# Patient Record
Sex: FEMALE | Race: WHITE | NOT HISPANIC OR LATINO | Employment: OTHER | URBAN - NONMETROPOLITAN AREA
[De-identification: names, ages, dates, MRNs, and addresses within clinical notes are randomized per-mention and may not be internally consistent; named-entity substitution may affect disease eponyms.]

---

## 2017-02-15 RX ORDER — LEVOTHYROXINE SODIUM 0.1 MG/1
TABLET ORAL
Qty: 30 TAB | Refills: 10 | Status: SHIPPED | OUTPATIENT
Start: 2017-02-15 | End: 2017-08-24

## 2017-02-21 ENCOUNTER — OFFICE VISIT (OUTPATIENT)
Dept: MEDICAL GROUP | Facility: PHYSICIAN GROUP | Age: 82
End: 2017-02-21
Payer: MEDICARE

## 2017-02-21 VITALS
DIASTOLIC BLOOD PRESSURE: 84 MMHG | HEIGHT: 58 IN | OXYGEN SATURATION: 97 % | RESPIRATION RATE: 18 BRPM | HEART RATE: 78 BPM | TEMPERATURE: 97.3 F | SYSTOLIC BLOOD PRESSURE: 122 MMHG | BODY MASS INDEX: 29.6 KG/M2 | WEIGHT: 141 LBS

## 2017-02-21 DIAGNOSIS — E11.69 DIABETES MELLITUS TYPE 2 IN OBESE (HCC): ICD-10-CM

## 2017-02-21 DIAGNOSIS — L85.9 HYPERKERATOSIS: ICD-10-CM

## 2017-02-21 DIAGNOSIS — E66.9 OBESITY (BMI 30-39.9): ICD-10-CM

## 2017-02-21 DIAGNOSIS — E55.9 VITAMIN D DEFICIENCY: ICD-10-CM

## 2017-02-21 DIAGNOSIS — E66.9 DIABETES MELLITUS TYPE 2 IN OBESE (HCC): ICD-10-CM

## 2017-02-21 DIAGNOSIS — E03.4 HYPOTHYROIDISM DUE TO ACQUIRED ATROPHY OF THYROID: ICD-10-CM

## 2017-02-21 PROCEDURE — G8432 DEP SCR NOT DOC, RNG: HCPCS | Performed by: INTERNAL MEDICINE

## 2017-02-21 PROCEDURE — 1036F TOBACCO NON-USER: CPT | Performed by: INTERNAL MEDICINE

## 2017-02-21 PROCEDURE — G8484 FLU IMMUNIZE NO ADMIN: HCPCS | Performed by: INTERNAL MEDICINE

## 2017-02-21 PROCEDURE — 4040F PNEUMOC VAC/ADMIN/RCVD: CPT | Mod: 8P | Performed by: INTERNAL MEDICINE

## 2017-02-21 PROCEDURE — 1101F PT FALLS ASSESS-DOCD LE1/YR: CPT | Performed by: INTERNAL MEDICINE

## 2017-02-21 PROCEDURE — 99214 OFFICE O/P EST MOD 30 MIN: CPT | Performed by: INTERNAL MEDICINE

## 2017-02-21 PROCEDURE — G8420 CALC BMI NORM PARAMETERS: HCPCS | Performed by: INTERNAL MEDICINE

## 2017-02-21 RX ORDER — LEVOTHYROXINE SODIUM 112 UG/1
112 TABLET ORAL
Qty: 30 TAB | Refills: 6 | Status: SHIPPED | OUTPATIENT
Start: 2017-02-21 | End: 2017-09-26 | Stop reason: SDUPTHER

## 2017-02-21 NOTE — PATIENT INSTRUCTIONS
Vit D increase to 5,000 IU    Increase the synthroid to 112 mcg a day    Labs and follow up in 3 mos    Dermatology

## 2017-02-21 NOTE — MR AVS SNAPSHOT
"        Deepali Steiner   2017 9:40 AM   Office Visit   MRN: [id number]    Department:  [facility]   Dept Phone:  [phone]    Description:  Female : 1935   Provider:  Dee DEWITT M.D.           Reason for Visit     Labs Only Completed 16      Allergies as of 2017     Allergen Noted Reactions    Pcn [Penicillins] 2014   Swelling    Sulfa Drugs 2014   Hives    Pain Relief 2014       Pt states can not take any pain killers      You were diagnosed with     Hypothyroidism due to acquired atrophy of thyroid   [9667733]       Vitamin D deficiency   [3247763]       Obesity (BMI 30-39.9)   [053704]       Diabetes mellitus type 2 in obese (CMS-HCC)   [346939]       Hyperkeratosis   [378193]         Vital Signs     Blood Pressure Pulse Temperature Respirations Height Weight    122/84 mmHg 78 36.3 °C (97.3 °F) 18 1.473 m (4' 9.99\") 63.957 kg (141 lb)    Body Mass Index Oxygen Saturation Smoking Status             29.48 kg/m2 97% Former Smoker         Basic Information     Date Of Birth Sex Race Ethnicity Preferred Language    1935 Female White Non- English      Problem List              ICD-10-CM Priority Class Noted - Resolved    Diabetes mellitus type 2 in obese (CMS-[facility]) E11.9, E66.9 High  2014 - Present    Osteoarthritis M19.90   2014 - Present    Edema R60.9   2014 - Present    Sciatica M54.30   2014 - Present    Lower urinary tract infectious disease N39.0   2014 - Present    Neuropathy (CMS-HCC) G62.9   2014 - Present    Hypothyroid E03.9   2014 - Present    Depression F32.9   2014 - Present    Cellulitis L03.90   12/3/2014 - Present    Excessive sleepiness G47.10   2014 - Present    Diarrhea R19.7   2015 - Present    Obesity (BMI 30-39.9) E66.9   2015 - Present    Dental abscess K04.7   2015 - Present    Vision loss H54.7   2016 - Present    Allergic rhinitis due to pollen " J30.1   2/16/2016 - Present    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   11/16/2016 - Present    Vitamin D deficiency E55.9   2/21/2017 - Present      Health Maintenance        Date Due Completion Dates    DIABETES MONOFILAMENT / LE EXAM 1935 ---    RETINAL SCREENING 6/11/1953 ---    IMM DTaP/Tdap/Td Vaccine (1 - Tdap) 6/11/1954 ---    PAP SMEAR 6/11/1956 ---    IMM ZOSTER VACCINE 6/11/1995 ---    BONE DENSITY 6/11/2000 ---    IMM PNEUMOCOCCAL 65+ (ADULT) LOW/MEDIUM RISK SERIES (1 of 2 - PCV13) 6/11/2000 ---    MAMMOGRAM 7/27/2010 7/27/2009, 7/27/2009, 11/29/2006, 11/21/2005, 11/16/2004    COLONOSCOPY 11/16/2011 11/16/2001 (Done)    Override on 11/16/2001: Done    URINE ACR / MICROALBUMIN 11/19/2015 11/19/2014    IMM INFLUENZA (1) 9/1/2016 ---    A1C SCREENING 5/17/2017 11/17/2016, 11/19/2014    FASTING LIPID PROFILE 11/17/2017 11/17/2016, 11/19/2014    SERUM CREATININE 11/17/2017 11/17/2016, 1/4/2016, 9/10/2015, 5/28/2015, 11/19/2014            Current Immunizations     No immunizations on file.      Below and/or attached are the medications your provider expects you to take. Review all of your home medications and newly ordered medications with your provider and/or pharmacist. Follow medication instructions as directed by your provider and/or pharmacist. Please keep your medication list with you and share with your provider. Update the information when medications are discontinued, doses are changed, or new medications (including over-the-counter products) are added; and carry medication information at all times in the event of emergency situations     Allergies:  PCN - Swelling     SULFA DRUGS - Hives     PAIN RELIEF - (reactions not documented)               Medications  Valid as of: February 21, 2017 - 10:32 AM    Generic Name Brand Name Tablet Size Instructions for use    Aspirin (Chew Tab) ASA 81 MG Take 81 mg by mouth every day.        Cholecalciferol (Cap) VITAMIN D3 5000 UNIT Take 1 Cap by mouth every  day.        Cranberry (Tab) Cranberry 125 MG Take  by mouth.        Fexofenadine HCl   Take  by mouth.        Gabapentin (Cap) NEURONTIN 300 MG Take 300 mg by mouth 3 times a day.        Glucosamine Sulfate (Cap) glucosamine Sulfate 500 MG Take 500 mg by mouth 2 Times a Day.        Ibuprofen (Cap) Ibuprofen 200 MG Take  by mouth.        Levothyroxine Sodium (Tab) SYNTHROID 100 MCG TAKE ONE TABLET BY MOUTH ONE TIME DAILY        Levothyroxine Sodium (Tab) SYNTHROID 112 MCG Take 1 Tab by mouth Every morning on an empty stomach.        Multiple Vitamins-Minerals (Tab) OCUVITE  Take 1 Tab by mouth every day.        .                 Medicines prescribed today were sent to:     [facility] [place], [place] - [address] [facility] [place] [address]    Phone: [phone] Fax: [phone]    Open 24 Hours?: No      Medication refill instructions:       If your prescription bottle indicates you have medication refills left, it is not necessary to call your provider’s office. Please contact your pharmacy and they will refill your medication.    If your prescription bottle indicates you do not have any refills left, you may request refills at any time through one of the following ways: The online [employer] system (except Urgent Care), by calling your provider’s office, or by asking your pharmacy to contact your provider’s office with a refill request. Medication refills are processed only during regular business hours and may not be available until the next business day. Your provider may request additional information or to have a follow-up visit with you prior to refilling your medication.   *Please Note: Medication refills are assigned a new Rx number when refilled electronically. Your pharmacy may indicate that no refills were authorized even though a new prescription for the same medication is available at the pharmacy. Please request the medicine by name with the pharmacy before contacting your  provider for a refill.        Your To Do List     Future Labs/Procedures Complete By Expires    BASIC METABOLIC PANEL  As directed 2/21/2018    TSH  As directed 2/21/2018      Referral     A referral request has been sent to our patient care coordination department. Please allow 3-5 business days for us to process this request and contact you either by phone or mail. If you do not hear from us by the 5th business day, please call us at [phone].        Instructions    Vit D increase to 5,000 IU    Increase the synthroid to 112 mcg a day    Labs and follow up in 3 mos    Dermatology           [employer] Access Code: [email or web address]  Expires: 3/23/2017 10:32 AM    [employer]  A secure, online tool to manage your health information     [employer]’s ONE [employer]® is a secure, online tool that connects you to your personalized health information from the privacy of your home -- day or night - making it very easy for you to manage your healthcare. Once the activation process is completed, you can even access your medical information using the ONE RECOVERY gal, which is available for free in the [employer] Gal store or Google Play store.     ONE [employer]OV[employer] provides the following levels of access (as shown below):   My Chart Features   [facility] Doctor [facility]  Urgent  Care [facility]  Primary Care  Doctor   Email your healthcare team securely and privately 24/7 X X X    Manage appointments: schedule your next appointment; view details of past/upcoming appointments X      Request prescription refills. X      View recent personal medical records, including lab and immunizations X X X X   View health record, including health history, allergies, medications X X X X   Read reports about your outpatient visits, procedures, consult and ER notes X X X X   See your discharge summary, which is a recap of your hospital and/or ER visit that includes your diagnosis, lab results, and care plan. X X       How to register for  MyChart:  1. Go to  [email or web address].  2. Click on the Sign Up Now box, which takes you to the New Member Sign Up page. You will need to provide the following information:  a. Enter your [employer] Access Code exactly as it appears at the top of this page. (You will not need to use this code after you’ve completed the sign-up process. If you do not sign up before the expiration date, you must request a new code.)   b. Enter your date of birth.   c. Enter your home email address.   d. Click Submit, and follow the next screen’s instructions.  3. Create a [employer] ID. This will be your [employer] login ID and cannot be changed, so think of one that is secure and easy to remember.  4. Create a [employer] password. You can change your password at any time.  5. Enter your Password Reset Question and Answer. This can be used at a later time if you forget your password.   6. Enter your e-mail address. This allows you to receive e-mail notifications when new information is available in [employer].  7. Click Sign Up. You can now view your health information.    For assistance activating your [employer] account, call [phone]

## 2017-02-21 NOTE — ASSESSMENT & PLAN NOTE
Patient has some skin lesions she would like checked, the area at the left forehead seems to be growing to her.  She reports history of skin cancers requiring surgery however, does not know the type of skin cancer.  She would like to see dermatology for completeness.

## 2017-02-21 NOTE — PROGRESS NOTES
Chief Complaint   Patient presents with   • Labs Only     Completed 11/17/16       HISTORY OF PRESENT ILLNESS: Patient is a 81 y.o. female established patient who presents today to discuss the medical issues below.    Hypothyroid  Continues on the 100 mcg daily, had labs. Weight is remaining stable at the 141 range.  Denies constipation diarrhea. She does have ongoing fatigue and naps daily. No night sweats    Vitamin D deficiency  Patient states she is taking 2,000 IU and had labs.      Obesity (BMI 30-39.9)  Weight is remaining stable.      Diabetes mellitus type 2 in obese  Not following at home, no polydipsia polyuria.  On no meds since the weight loss.  She is trying to follow diet.      Hyperkeratosis  Patient has some skin lesions she would like checked, the area at the left forehead seems to be growing to her.  She reports history of skin cancers requiring surgery however, does not know the type of skin cancer.  She would like to see dermatology for completeness.       Patient Active Problem List    Diagnosis Date Noted   • Diabetes mellitus type 2 in obese (CMS-HCC) 11/18/2014     Priority: High   • Vitamin D deficiency 02/21/2017   • Hyperkeratosis 02/21/2017   • Meniere's disease of right ear 11/16/2016   • Vision loss 02/16/2016   • Allergic rhinitis due to pollen 02/16/2016   • Dental abscess 07/16/2015   • Obesity (BMI 30-39.9) 06/04/2015   • Diarrhea 04/16/2015   • Excessive sleepiness 12/19/2014   • Cellulitis 12/03/2014   • Osteoarthritis 11/18/2014   • Edema 11/18/2014   • Sciatica 11/18/2014   • Lower urinary tract infectious disease 11/18/2014   • Neuropathy (CMS-HCC) 11/18/2014   • Hypothyroid 11/18/2014   • Depression 11/18/2014       Allergies:Pcn; Sulfa drugs; and Pain relief    Current Outpatient Prescriptions   Medication Sig Dispense Refill   • cholecalciferol (VITAMIN D3) 5000 UNIT Cap Take 1 Cap by mouth every day. 90 Cap 3   • levothyroxine (SYNTHROID) 112 MCG Tab Take 1 Tab by mouth  Every morning on an empty stomach. 30 Tab 6   • levothyroxine (SYNTHROID) 100 MCG Tab TAKE ONE TABLET BY MOUTH ONE TIME DAILY 30 Tab 10   • gabapentin (NEURONTIN) 300 MG Cap Take 300 mg by mouth 3 times a day.     • Multiple Vitamins-Minerals (OCUVITE) Tab Take 1 Tab by mouth every day.     • Fexofenadine HCl (ALLEGRA ALLERGY PO) Take  by mouth.     • Ibuprofen (ADVIL) 200 MG CAPS Take  by mouth.     • aspirin (BABY ASPIRIN) 81 MG CHEW chewable tablet Take 81 mg by mouth every day.     • glucosamine Sulfate 500 MG Cap Take 500 mg by mouth 2 Times a Day.     • Cranberry 125 MG TABS Take  by mouth.       No current facility-administered medications for this visit.         Past Medical History   Diagnosis Date   • Diabetes mellitus (CMS-HCC)    • Diabetes (CMS-HCC) 2014   • Osteoarthritis 2014     recommended hips and knee replacemtns, Dr Sarah Colunga   • Edema 2014   • Sciatica 2014   • UTI (lower urinary tract infection) 2014   • Neuropathy (CMS-HCC) 2014     Dr Williamson idiopathic neuropathy, 2003   • Hypothyroid 2014   • Depression 2014   • Idiopathic neuropathy 2014     Patient adamant this started prior to the dx of DM/    • Vertigo 2014   • Excessive sleepiness 2014   • Diarrhea 2015   • Dental abscess 2015   • Vision loss 2016   • Allergic rhinitis due to pollen 2016   • Vitamin D deficiency 2017   • Hyperkeratosis 2017       Social History   Substance Use Topics   • Smoking status: Former Smoker     Types: Cigarettes     Quit date: 2016   • Smokeless tobacco: Never Used   • Alcohol Use: 0.0 oz/week     0 Standard drinks or equivalent per week      Comment: occasional        Family Status   Relation Status Death Age   • Son  56     MI   • Son  36        • Mother  93     colon rupture   • Father  72     thrombosis   History reviewed. No pertinent family  "history.    ROS:    Respiratory: Negative for cough, sputum production, shortness of breath or wheezing.    Cardiovascular: Negative for chest pain, palpitations, orthopnea, dyspnea with exertion or edema.   Gastrointestinal: Negative for GI upset, nausea, vomiting, abdominal pain, constipation or diarrhea.   Genitourinary: Negative for dysuria, urgency, hesitancy or frequency.       Exam:    Blood pressure 122/84, pulse 78, temperature 36.3 °C (97.3 °F), resp. rate 18, height 1.473 m (4' 9.99\"), weight 63.957 kg (141 lb), SpO2 97 %.  General:  Well nourished, well developed female in NAD.  Skin: Diffuse hyperkeratosis one at the left forehead that concerns her consistent with verrucous keratosis no pigmentation no ulceration.  Pulmonary: Clear to ausculation and percussion.  Normal effort. No rales, rhonchi, or wheezing.  Cardiovascular: Regular rate and rhythm without murmur.   Abdomen: Normal bowel sounds soft and nontender no palpable liver spleen bladder mass.  Extremities: No LE edema noted.  Neuro: Grossly nonfocal.  Psych: Alert and oriented to person, place, and time. Appropriate mood and conversation.    LABS: Results reviewed and discussed with the patient, questions answered.      This dictation was created using voice recognition software. I have made reasonable attempts to correct errors, however, errors of grammar and content may exist.          Assessment/Plan:    1. Hypothyroidism due to acquired atrophy of thyroid  TSH remains elevated, discussed with the patient increased Synthroid to 112 µg follow-up labs monitor clinically  - TSH; Future  - levothyroxine (SYNTHROID) 112 MCG Tab; Take 1 Tab by mouth Every morning on an empty stomach.  Dispense: 30 Tab; Refill: 6    2. Vitamin D deficiency  Vitamin D level subtherapeutic increase supplementation follow-up labs  - cholecalciferol (VITAMIN D3) 5000 UNIT Cap; Take 1 Cap by mouth every day.  Dispense: 90 Cap; Refill: 3    3. Obesity (BMI " 30-39.9)  Remaining stable with diet    4. Diabetes mellitus type 2 in obese (CMS-HCC)  A1c is adequately controlled on no medications. Patient is excited to not be taking medications. Reviewed diet exercise weight loss  - BASIC METABOLIC PANEL; Future    5. Hyperkeratosis  All appear benign patient requesting referral to dermatology for evaluation and/or therapy.  - REFERRAL TO DERMATOLOGY

## 2017-02-21 NOTE — ASSESSMENT & PLAN NOTE
Not following at home, no polydipsia polyuria.  On no meds since the weight loss.  She is trying to follow diet.

## 2017-02-28 ENCOUNTER — OFFICE VISIT (OUTPATIENT)
Dept: URGENT CARE | Facility: PHYSICIAN GROUP | Age: 82
End: 2017-02-28
Payer: MEDICARE

## 2017-02-28 ENCOUNTER — HOSPITAL ENCOUNTER (OUTPATIENT)
Facility: MEDICAL CENTER | Age: 82
End: 2017-02-28
Attending: PHYSICIAN ASSISTANT
Payer: MEDICARE

## 2017-02-28 VITALS
BODY MASS INDEX: 30.23 KG/M2 | OXYGEN SATURATION: 99 % | HEART RATE: 59 BPM | SYSTOLIC BLOOD PRESSURE: 124 MMHG | DIASTOLIC BLOOD PRESSURE: 80 MMHG | WEIGHT: 144 LBS | RESPIRATION RATE: 16 BRPM | HEIGHT: 58 IN | TEMPERATURE: 98.6 F

## 2017-02-28 DIAGNOSIS — R30.0 DYSURIA: ICD-10-CM

## 2017-02-28 DIAGNOSIS — N30.00 ACUTE CYSTITIS WITHOUT HEMATURIA: ICD-10-CM

## 2017-02-28 LAB
APPEARANCE UR: NORMAL
BILIRUB UR STRIP-MCNC: NORMAL MG/DL
COLOR UR AUTO: YELLOW
GLUCOSE UR STRIP.AUTO-MCNC: NORMAL MG/DL
KETONES UR STRIP.AUTO-MCNC: NORMAL MG/DL
LEUKOCYTE ESTERASE UR QL STRIP.AUTO: NORMAL
NITRITE UR QL STRIP.AUTO: NORMAL
PH UR STRIP.AUTO: 6 [PH] (ref 5–8)
PROT UR QL STRIP: NORMAL MG/DL
RBC UR QL AUTO: NORMAL
SP GR UR STRIP.AUTO: 1.02
UROBILINOGEN UR STRIP-MCNC: NORMAL MG/DL

## 2017-02-28 PROCEDURE — 4040F PNEUMOC VAC/ADMIN/RCVD: CPT | Mod: 8P | Performed by: PHYSICIAN ASSISTANT

## 2017-02-28 PROCEDURE — G8484 FLU IMMUNIZE NO ADMIN: HCPCS | Performed by: PHYSICIAN ASSISTANT

## 2017-02-28 PROCEDURE — G8419 CALC BMI OUT NRM PARAM NOF/U: HCPCS | Performed by: PHYSICIAN ASSISTANT

## 2017-02-28 PROCEDURE — 1101F PT FALLS ASSESS-DOCD LE1/YR: CPT | Performed by: PHYSICIAN ASSISTANT

## 2017-02-28 PROCEDURE — 99214 OFFICE O/P EST MOD 30 MIN: CPT | Performed by: PHYSICIAN ASSISTANT

## 2017-02-28 PROCEDURE — 81002 URINALYSIS NONAUTO W/O SCOPE: CPT | Performed by: PHYSICIAN ASSISTANT

## 2017-02-28 PROCEDURE — 87086 URINE CULTURE/COLONY COUNT: CPT

## 2017-02-28 PROCEDURE — 87186 SC STD MICRODIL/AGAR DIL: CPT

## 2017-02-28 PROCEDURE — G8432 DEP SCR NOT DOC, RNG: HCPCS | Performed by: PHYSICIAN ASSISTANT

## 2017-02-28 PROCEDURE — 87077 CULTURE AEROBIC IDENTIFY: CPT

## 2017-02-28 PROCEDURE — 1036F TOBACCO NON-USER: CPT | Performed by: PHYSICIAN ASSISTANT

## 2017-02-28 RX ORDER — NITROFURANTOIN 25; 75 MG/1; MG/1
100 CAPSULE ORAL 2 TIMES DAILY
Qty: 10 CAP | Refills: 0 | Status: SHIPPED | OUTPATIENT
Start: 2017-02-28 | End: 2017-03-05

## 2017-02-28 ASSESSMENT — ENCOUNTER SYMPTOMS
NAUSEA: 0
FLANK PAIN: 0
CHILLS: 0
SWEATS: 0
VOMITING: 0
ABDOMINAL PAIN: 0
FEVER: 0

## 2017-02-28 NOTE — MR AVS SNAPSHOT
"        Deepali Steiner   2017 4:55 PM   Office Visit   MRN: [id number]    Department:  [facility]   Dept Phone:  [phone]    Description:  Female : 1935   Provider:  MOUNIKA Lawson           Reason for Visit     Dysuria x3days frequent/urgent urination, burining      Allergies as of 2017     Allergen Noted Reactions    Pcn [Penicillins] 2014   Swelling    Sulfa Drugs 2014   Hives    Pain Relief 2014       Pt states can not take any pain killers      You were diagnosed with     Acute cystitis without hematuria   [260711]       Dysuria   [788.1.ICD-9-CM]         Vital Signs     Blood Pressure Pulse Temperature Respirations Height Weight    124/80 mmHg 59 37 °C (98.6 °F) 16 1.473 m (4' 9.99\") 65.318 kg (144 lb)    Body Mass Index Oxygen Saturation Breastfeeding? Smoking Status          30.10 kg/m2 99% No Former Smoker        Basic Information     Date Of Birth Sex Race Ethnicity Preferred Language    1935 Female White Non- English      Your appointments     May 05, 2017 11:40 AM   Established Patient with Dee DEWITT M.D.   [facility] (--)    [address] [address] [phone]           You will be receiving a confirmation call a few days before your appointment from our automated call confirmation system.              Problem List              ICD-10-CM Priority Class Noted - Resolved    Diabetes mellitus type 2 in obese (CMS-HCC) E11.9, E66.9 High  2014 - Present    Osteoarthritis M19.90   2014 - Present    Edema R60.9   2014 - Present    Sciatica M54.30   2014 - Present    Lower urinary tract infectious disease N39.0   2014 - Present    Neuropathy (CMS-[facility]) G62.9   2014 - Present    Hypothyroid E03.9   2014 - Present    Depression F32.9   2014 - Present    Cellulitis L03.90   12/3/2014 - Present    Excessive sleepiness G47.10   2014 - Present   " Diarrhea R19.7   4/16/2015 - Present    Obesity (BMI 30-39.9) E66.9   6/4/2015 - Present    Dental abscess K04.7   7/16/2015 - Present    Vision loss H54.7   2/16/2016 - Present    Allergic rhinitis due to pollen J30.1   2/16/2016 - Present    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   11/16/2016 - Present    Vitamin D deficiency E55.9   2/21/2017 - Present    Hyperkeratosis L85.9   2/21/2017 - Present      Health Maintenance        Date Due Completion Dates    DIABETES MONOFILAMENT / LE EXAM 1935 ---    RETINAL SCREENING 6/11/1953 ---    IMM DTaP/Tdap/Td Vaccine (1 - Tdap) 6/11/1954 ---    PAP SMEAR 6/11/1956 ---    IMM ZOSTER VACCINE 6/11/1995 ---    BONE DENSITY 6/11/2000 ---    IMM PNEUMOCOCCAL 65+ (ADULT) LOW/MEDIUM RISK SERIES (1 of 2 - PCV13) 6/11/2000 ---    MAMMOGRAM 7/27/2010 7/27/2009, 7/27/2009, 11/29/2006, 11/21/2005, 11/16/2004    COLONOSCOPY 11/16/2011 11/16/2001 (Done)    Override on 11/16/2001: Done    URINE ACR / MICROALBUMIN 11/19/2015 11/19/2014    IMM INFLUENZA (1) 9/1/2016 ---    A1C SCREENING 5/17/2017 11/17/2016, 11/19/2014    FASTING LIPID PROFILE 11/17/2017 11/17/2016, 11/19/2014    SERUM CREATININE 11/17/2017 11/17/2016, 1/4/2016, 9/10/2015, 5/28/2015, 11/19/2014            Results     POCT Urinalysis      Component Value Standard Range & Units    POC Color yellow Negative    POC Appearance sloudy Negative    POC Leukocyte Esterase mod Negative    POC Nitrites neg Negative    POC Urobiligen neg Negative (0.2) mg/dL    POC Protein tr Negative mg/dL    POC Urine PH 6.0 5.0 - 8.0    POC Blood tr Negative    POC Specific Gravity 1.020 <1.005 - >1.030    POC Ketones neg Negative mg/dL    POC Biliruben neg Negative mg/dL    POC Glucose neg Negative mg/dL                        Current Immunizations     No immunizations on file.      Below and/or attached are the medications your provider expects you to take. Review all of your home medications and newly ordered medications with your provider  and/or pharmacist. Follow medication instructions as directed by your provider and/or pharmacist. Please keep your medication list with you and share with your provider. Update the information when medications are discontinued, doses are changed, or new medications (including over-the-counter products) are added; and carry medication information at all times in the event of emergency situations     Allergies:  PCN - Swelling     SULFA DRUGS - Hives     PAIN RELIEF - (reactions not documented)               Medications  Valid as of: February 28, 2017 -  5:20 PM    Generic Name Brand Name Tablet Size Instructions for use    Aspirin (Chew Tab) ASA 81 MG Take 81 mg by mouth every day.        Cholecalciferol (Cap) VITAMIN D3 5000 UNIT Take 1 Cap by mouth every day.        Fexofenadine HCl   Take  by mouth.        Gabapentin (Cap) NEURONTIN 300 MG Take 300 mg by mouth 3 times a day.        Ibuprofen (Cap) Ibuprofen 200 MG Take  by mouth.        Levothyroxine Sodium (Tab) SYNTHROID 100 MCG TAKE ONE TABLET BY MOUTH ONE TIME DAILY        Levothyroxine Sodium (Tab) SYNTHROID 112 MCG Take 1 Tab by mouth Every morning on an empty stomach.        Multiple Vitamins-Minerals (Tab) OCUVITE  Take 1 Tab by mouth every day.        Nitrofurantoin Monohyd Macro (Cap) MACROBID 100 MG Take 1 Cap by mouth 2 times a day for 5 days.        .                 Medicines prescribed today were sent to:     [facility] [address] [address]    Phone: [phone] Fax: [phone]    Open 24 Hours?: No      Medication refill instructions:       If your prescription bottle indicates you have medication refills left, it is not necessary to call your provider’s office. Please contact your pharmacy and they will refill your medication.    If your prescription bottle indicates you do not have any refills left, you may request refills at any time through one of the following ways: The online  [employer] system (except Urgent Care), by calling your provider’s office, or by asking your pharmacy to contact your provider’s office with a refill request. Medication refills are processed only during regular business hours and may not be available until the next business day. Your provider may request additional information or to have a follow-up visit with you prior to refilling your medication.   *Please Note: Medication refills are assigned a new Rx number when refilled electronically. Your pharmacy may indicate that no refills were authorized even though a new prescription for the same medication is available at the pharmacy. Please request the medicine by name with the pharmacy before contacting your provider for a refill.        Your To Do List     Future Labs/Procedures Complete By Expires    URINE CULTURE(NEW)  As directed 3/1/2018      Instructions    Urinary Tract Infection  Urinary tract infections (UTIs) can develop anywhere along your urinary tract. Your urinary tract is your body's drainage system for removing wastes and extra water. Your urinary tract includes two kidneys, two ureters, a bladder, and a urethra. Your kidneys are a pair of bean-shaped organs. Each kidney is about the size of your fist. They are located below your ribs, one on each side of your spine.  CAUSES  Infections are caused by microbes, which are microscopic organisms, including fungi, viruses, and bacteria. These organisms are so small that they can only be seen through a microscope. Bacteria are the microbes that most commonly cause UTIs.  SYMPTOMS   Symptoms of UTIs may vary by age and gender of the patient and by the location of the infection. Symptoms in young women typically include a frequent and intense urge to urinate and a painful, burning feeling in the bladder or urethra during urination. Older women and men are more likely to be tired, shaky, and weak and have muscle aches and abdominal pain. A fever may mean the  infection is in your kidneys. Other symptoms of a kidney infection include pain in your back or sides below the ribs, nausea, and vomiting.  DIAGNOSIS  To diagnose a UTI, your caregiver will ask you about your symptoms. Your caregiver also will ask to provide a urine sample. The urine sample will be tested for bacteria and white blood cells. White blood cells are made by your body to help fight infection.  TREATMENT   Typically, UTIs can be treated with medication. Because most UTIs are caused by a bacterial infection, they usually can be treated with the use of antibiotics. The choice of antibiotic and length of treatment depend on your symptoms and the type of bacteria causing your infection.  HOME CARE INSTRUCTIONS  · If you were prescribed antibiotics, take them exactly as your caregiver instructs you. Finish the medication even if you feel better after you have only taken some of the medication.  · Drink enough water and fluids to keep your urine clear or pale yellow.  · Avoid caffeine, tea, and carbonated beverages. They tend to irritate your bladder.  · Empty your bladder often. Avoid holding urine for long periods of time.  · Empty your bladder before and after sexual intercourse.  · After a bowel movement, women should cleanse from front to back. Use each tissue only once.  SEEK MEDICAL CARE IF:   · You have back pain.  · You develop a fever.  · Your symptoms do not begin to resolve within 3 days.  SEEK IMMEDIATE MEDICAL CARE IF:   · You have severe back pain or lower abdominal pain.  · You develop chills.  · You have nausea or vomiting.  · You have continued burning or discomfort with urination.  MAKE SURE YOU:   · Understand these instructions.  · Will watch your condition.  · Will get help right away if you are not doing well or get worse.     This information is not intended to replace advice given to you by your health care provider. Make sure you discuss any questions you have with your health care  provider.     Document Released: 09/27/2006 Document Revised: 01/08/2016 Document Reviewed: 01/25/2013  [employer] Interactive Patient Education ©2016 Elsevier Inc.            [employer] Access Code: [email or web address]  Expires: 3/23/2017 10:32 AM    MyChart  A secure, online tool to manage your health information     [employer]® is a secure, online tool that connects you to your personalized health information from the privacy of your home -- day or night - making it very easy for you to manage your healthcare. Once the activation process is completed, you can even access your medical information using the [employer] gla, which is available for free in the Apple Gal store or Google Play store.     [employer] provides the following levels of access (as shown below):   My Chart Features   [facility] Doctor [facility]  Urgent  Care [facility]  Primary Care  Doctor   Email your healthcare team securely and privately 24/7 X X X    Manage appointments: schedule your next appointment; view details of past/upcoming appointments X      Request prescription refills. X      View recent personal medical records, including lab and immunizations X X X X   View health record, including health history, allergies, medications X X X X   Read reports about your outpatient visits, procedures, consult and ER notes X X X X   See your discharge summary, which is a recap of your hospital and/or ER visit that includes your diagnosis, lab results, and care plan. X X       How to register for [employer]:  1. Go to  [email or web address].  2. Click on the Sign Up Now box, which takes you to the New Member Sign Up page. You will need to provide the following information:  a. Enter your [employer] Access Code exactly as it appears at the top of this page. (You will not need to use this code after you’ve completed the sign-up process. If you do not sign up before the expiration date, you must request a new code.)    b. Enter your date of birth.   c. Enter your home email address.   d. Click Submit, and follow the next screen’s instructions.  3. Create a [employer] ID. This will be your [employer] login ID and cannot be changed, so think of one that is secure and easy to remember.  4. Create a [employer] password. You can change your password at any time.  5. Enter your Password Reset Question and Answer. This can be used at a later time if you forget your password.   6. Enter your e-mail address. This allows you to receive e-mail notifications when new information is available in [employer].  7. Click Sign Up. You can now view your health information.    For assistance activating your [employer] account, call [phone]

## 2017-03-01 DIAGNOSIS — N30.00 ACUTE CYSTITIS WITHOUT HEMATURIA: ICD-10-CM

## 2017-03-01 NOTE — PATIENT INSTRUCTIONS
Urinary Tract Infection  Urinary tract infections (UTIs) can develop anywhere along your urinary tract. Your urinary tract is your body's drainage system for removing wastes and extra water. Your urinary tract includes two kidneys, two ureters, a bladder, and a urethra. Your kidneys are a pair of bean-shaped organs. Each kidney is about the size of your fist. They are located below your ribs, one on each side of your spine.  CAUSES  Infections are caused by microbes, which are microscopic organisms, including fungi, viruses, and bacteria. These organisms are so small that they can only be seen through a microscope. Bacteria are the microbes that most commonly cause UTIs.  SYMPTOMS   Symptoms of UTIs may vary by age and gender of the patient and by the location of the infection. Symptoms in young women typically include a frequent and intense urge to urinate and a painful, burning feeling in the bladder or urethra during urination. Older women and men are more likely to be tired, shaky, and weak and have muscle aches and abdominal pain. A fever may mean the infection is in your kidneys. Other symptoms of a kidney infection include pain in your back or sides below the ribs, nausea, and vomiting.  DIAGNOSIS  To diagnose a UTI, your caregiver will ask you about your symptoms. Your caregiver also will ask to provide a urine sample. The urine sample will be tested for bacteria and white blood cells. White blood cells are made by your body to help fight infection.  TREATMENT   Typically, UTIs can be treated with medication. Because most UTIs are caused by a bacterial infection, they usually can be treated with the use of antibiotics. The choice of antibiotic and length of treatment depend on your symptoms and the type of bacteria causing your infection.  HOME CARE INSTRUCTIONS  · If you were prescribed antibiotics, take them exactly as your caregiver instructs you. Finish the medication even if you feel better after you  have only taken some of the medication.  · Drink enough water and fluids to keep your urine clear or pale yellow.  · Avoid caffeine, tea, and carbonated beverages. They tend to irritate your bladder.  · Empty your bladder often. Avoid holding urine for long periods of time.  · Empty your bladder before and after sexual intercourse.  · After a bowel movement, women should cleanse from front to back. Use each tissue only once.  SEEK MEDICAL CARE IF:   · You have back pain.  · You develop a fever.  · Your symptoms do not begin to resolve within 3 days.  SEEK IMMEDIATE MEDICAL CARE IF:   · You have severe back pain or lower abdominal pain.  · You develop chills.  · You have nausea or vomiting.  · You have continued burning or discomfort with urination.  MAKE SURE YOU:   · Understand these instructions.  · Will watch your condition.  · Will get help right away if you are not doing well or get worse.     This information is not intended to replace advice given to you by your health care provider. Make sure you discuss any questions you have with your health care provider.     Document Released: 09/27/2006 Document Revised: 01/08/2016 Document Reviewed: 01/25/2013  [employer] Interactive Patient Education ©2016 [employer]

## 2017-03-01 NOTE — PROGRESS NOTES
Subjective:      Deepali Steiner is a 81 y.o. female who presents with Dysuria            HPI Comments: Three-day history of dysuria, urgency, frequency. No abdominal pain, back pain, fever, chills, nausea, vomiting, or other complaints. History of UTIs in the past.    Dysuria   This is a new problem. The current episode started in the past 7 days. The problem occurs every urination. The problem has been waxing and waning. The quality of the pain is described as aching and burning. The pain is mild. Associated symptoms include frequency and urgency. Pertinent negatives include no chills, discharge, flank pain, hematuria, hesitancy, nausea, possible pregnancy, sweats or vomiting. She has tried nothing for the symptoms. The treatment provided no relief. Her past medical history is significant for recurrent UTIs.       Review of Systems   Constitutional: Negative for fever and chills.   Gastrointestinal: Negative for nausea, vomiting and abdominal pain.   Genitourinary: Positive for dysuria, urgency and frequency. Negative for hesitancy, hematuria and flank pain.     Allergies:Pcn; Sulfa drugs; and Pain relief    Current Outpatient Prescriptions Ordered in [facility]   Medication Sig Dispense Refill   • nitrofurantoin monohydr macro (MACROBID) 100 MG Cap Take 1 Cap by mouth 2 times a day for 5 days. 10 Cap 0   • cholecalciferol (VITAMIN D3) 5000 UNIT Cap Take 1 Cap by mouth every day. 90 Cap 3   • levothyroxine (SYNTHROID) 112 MCG Tab Take 1 Tab by mouth Every morning on an empty stomach. 30 Tab 6   • levothyroxine (SYNTHROID) 100 MCG Tab TAKE ONE TABLET BY MOUTH ONE TIME DAILY 30 Tab 10   • gabapentin (NEURONTIN) 300 MG Cap Take 300 mg by mouth 3 times a day.     • Multiple Vitamins-Minerals (OCUVITE) Tab Take 1 Tab by mouth every day.     • Fexofenadine HCl (ALLEGRA ALLERGY PO) Take  by mouth.     • Ibuprofen (ADVIL) 200 MG CAPS Take  by mouth.     • aspirin (BABY ASPIRIN) 81 MG CHEW chewable tablet Take 81 mg by mouth every  "day.       No current Epic-ordered facility-administered medications on file.       Past Medical History   Diagnosis Date   • Diabetes mellitus (CMS-[facility])    • Diabetes (CMS-[facility]) 2014   • Osteoarthritis 2014     recommended hips and knee replacemtns, Dr Sarah Colunga   • Edema 2014   • Sciatica 2014   • UTI (lower urinary tract infection) 2014   • Neuropathy (CMS-HCC) 2014     Dr Williamson idiopathic neuropathy,    • Hypothyroid 2014   • Depression 2014   • Idiopathic neuropathy 2014     Patient adamant this started prior to the dx of DM/    • Vertigo 2014   • Excessive sleepiness 2014   • Diarrhea 2015   • Dental abscess 2015   • Vision loss 2016   • Allergic rhinitis due to pollen 2016   • Vitamin D deficiency 2017   • Hyperkeratosis 2017       Social History   Substance Use Topics   • Smoking status: Former Smoker     Types: Cigarettes     Quit date: 2016   • Smokeless tobacco: Never Used   • Alcohol Use: 0.0 oz/week     0 Standard drinks or equivalent per week      Comment: occasional        Family Status   Relation Status Death Age   • Son  56     MI   • Son  36        • Mother  93     colon rupture   • Father  72     thrombosis   History reviewed. No pertinent family history.       Objective:     /80 mmHg  Pulse 59  Temp(Src) 37 °C (98.6 °F)  Resp 16  Ht 1.473 m (4' 9.99\")  Wt 65.318 kg (144 lb)  BMI 30.10 kg/m2  SpO2 99%  Breastfeeding? No     Physical Exam   Constitutional: She is oriented to person, place, and time. She appears well-developed and well-nourished. No distress.   HENT:   Head: Normocephalic and atraumatic.   Neck: Normal range of motion. Neck supple.   Cardiovascular: Normal rate and regular rhythm.    Pulmonary/Chest: Effort normal and breath sounds normal.   Abdominal: Soft. She exhibits no distension and no mass. There is no " tenderness. There is no rebound and no guarding.   No CVA tenderness   Neurological: She is alert and oriented to person, place, and time.   Skin: Skin is warm and dry. She is not diaphoretic.   Psychiatric: She has a normal mood and affect. Her behavior is normal. Judgment and thought content normal.   Nursing note and vitals reviewed.    Labs: Urinalysis positive for leukocytes and blood          Assessment/Plan:     1. Acute cystitis without hematuria  nitrofurantoin monohydr macro (MACROBID) 100 MG Cap    URINE CULTURE(NEW)    Ongoing for 3 days. Urine positive for leukocytes and blood. No signs of pyelonephritis. Start Macrobid. Culture urine. Follow-up with PCP.   2. Dysuria  POCT Urinalysis       Elsevier Interactive Patient Education given: UTI    Please note that this dictation was created using voice recognition software. I have made every reasonable attempt to correct obvious errors, but I expect that there are errors of grammar and possibly content that I did not discover before finalizing the note.

## 2017-03-03 LAB
BACTERIA UR CULT: ABNORMAL
SIGNIFICANT IND 70042: ABNORMAL
SOURCE SOURCE: ABNORMAL

## 2017-06-12 ENCOUNTER — PATIENT OUTREACH (OUTPATIENT)
Dept: HEALTH INFORMATION MANAGEMENT | Facility: OTHER | Age: 82
End: 2017-06-12

## 2017-08-24 ENCOUNTER — OFFICE VISIT (OUTPATIENT)
Dept: MEDICAL GROUP | Facility: PHYSICIAN GROUP | Age: 82
End: 2017-08-24
Payer: MEDICARE

## 2017-08-24 VITALS
HEART RATE: 67 BPM | SYSTOLIC BLOOD PRESSURE: 138 MMHG | DIASTOLIC BLOOD PRESSURE: 52 MMHG | WEIGHT: 137 LBS | HEIGHT: 57 IN | RESPIRATION RATE: 16 BRPM | OXYGEN SATURATION: 99 % | BODY MASS INDEX: 29.56 KG/M2 | TEMPERATURE: 98.1 F

## 2017-08-24 DIAGNOSIS — H81.01 MENIERE'S DISEASE OF RIGHT EAR: ICD-10-CM

## 2017-08-24 DIAGNOSIS — E55.9 VITAMIN D DEFICIENCY: ICD-10-CM

## 2017-08-24 DIAGNOSIS — E66.9 DIABETES MELLITUS TYPE 2 IN OBESE (HCC): ICD-10-CM

## 2017-08-24 DIAGNOSIS — E78.5 HYPERLIPIDEMIA, UNSPECIFIED HYPERLIPIDEMIA TYPE: ICD-10-CM

## 2017-08-24 DIAGNOSIS — Z12.11 COLON CANCER SCREENING: ICD-10-CM

## 2017-08-24 DIAGNOSIS — M19.011 PRIMARY OSTEOARTHRITIS OF RIGHT SHOULDER: ICD-10-CM

## 2017-08-24 DIAGNOSIS — E03.4 HYPOTHYROIDISM DUE TO ACQUIRED ATROPHY OF THYROID: ICD-10-CM

## 2017-08-24 DIAGNOSIS — E11.69 DIABETES MELLITUS TYPE 2 IN OBESE (HCC): ICD-10-CM

## 2017-08-24 DIAGNOSIS — R60.9 EDEMA, UNSPECIFIED TYPE: ICD-10-CM

## 2017-08-24 DIAGNOSIS — G62.9 NEUROPATHY: ICD-10-CM

## 2017-08-24 PROCEDURE — 99214 OFFICE O/P EST MOD 30 MIN: CPT | Performed by: INTERNAL MEDICINE

## 2017-08-24 NOTE — ASSESSMENT & PLAN NOTE
Patient has lost about 35# primarily with portion control and is currently not taking any diuretics.  She reports nite slight edema.

## 2017-08-24 NOTE — ASSESSMENT & PLAN NOTE
Patient dx with idiopathic neuropathy in 2003, subsequently with numbness bilateral feet at baseline per the patient. Using the gabapentin and feels this helps, continues with Dr Williamson.

## 2017-08-24 NOTE — MR AVS SNAPSHOT
"        Deepali Steiner   2017 2:00 PM   Office Visit   MRN: [id number]    Department:  [facility]   Dept Phone:  [phone]    Description:  Female : 1935   Provider:  Dee DEWITT M.D.           Reason for Visit     Follow-Up Blood pressure       Allergies as of 2017     Allergen Noted Reactions    Pcn [Penicillins] 2014   Swelling    Sulfa Drugs 2014   Hives    Pain Relief 2014       Pt states can not take any pain killers      You were diagnosed with     Neuropathy (CMS-[facility])   [583420]       Edema, unspecified type   [3928410]       Diabetes mellitus type 2 in obese (CMS-[facility])   [028566]       Hypothyroidism due to acquired atrophy of thyroid   [4418201]       Primary osteoarthritis of right shoulder   [074103]       Vitamin D deficiency   [7779815]       Hyperlipidemia, unspecified hyperlipidemia type   [4724874]       Meniere's disease of right ear   [285169]       Colon cancer screening   [808988]         Vital Signs     Blood Pressure Pulse Temperature Respirations Height Weight    138/52 mmHg 67 36.7 °C (98.1 °F) 16 1.448 m (4' 9.01\") 62.143 kg (137 lb)    Body Mass Index Oxygen Saturation Smoking Status             29.64 kg/m2 99% Former Smoker         Basic Information     Date Of Birth Sex Race Ethnicity Preferred Language    1935 Female White Non- English      Your appointments     2018 10:00 AM   Adult Draw/Collection with [facility]   [facility] (--)    [address] [address]   [phone]            Mar 02, 2018  1:00 PM   Established Patient with Dee DEWITT M.D.   [facility] (--)    [address] [id number] [phone]           You will be receiving a confirmation call a few days before your appointment from our automated call confirmation system.              Problem List              ICD-10-CM Priority Class Noted - Resolved    Diabetes mellitus type 2 in " obese (CMS-HCC) E11.9, E66.9 High  11/18/2014 - Present    Osteoarthritis M19.90 Medium  11/18/2014 - Present    Edema R60.9 Medium  11/18/2014 - Present    Sciatica M54.30   11/18/2014 - Present    Lower urinary tract infectious disease N39.0   11/18/2014 - Present    Neuropathy (CMS-HCC) G62.9 High  11/18/2014 - Present    Hypothyroid E03.9 Medium  11/18/2014 - Present    Depression F32.9 Medium  11/18/2014 - Present    Cellulitis L03.90   12/3/2014 - Present    Excessive sleepiness G47.10   12/19/2014 - Present    Diarrhea R19.7   4/16/2015 - Present    Obesity (BMI 30-39.9) E66.9 Medium  6/4/2015 - Present    Dental abscess K04.7   7/16/2015 - Present    Vision loss H54.7   2/16/2016 - Present    Allergic rhinitis due to pollen J30.1   2/16/2016 - Present    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   11/16/2016 - Present    Vitamin D deficiency E55.9 Medium  2/21/2017 - Present    Hyperkeratosis L85.9   2/21/2017 - Present      Health Maintenance        Date Due Completion Dates    DIABETES MONOFILAMENT / LE EXAM 1935 ---    RETINAL SCREENING 6/11/1953 ---    IMM DTaP/Tdap/Td Vaccine (1 - Tdap) 6/11/1954 ---    BONE DENSITY 6/11/2000 ---    MAMMOGRAM 7/27/2010 7/27/2009, 7/27/2009, 11/29/2006, 11/21/2005, 11/16/2004    URINE ACR / MICROALBUMIN 11/19/2015 11/19/2014    A1C SCREENING 5/17/2017 11/17/2016, 11/19/2014    IMM INFLUENZA (1) 9/1/2017 9/29/2016    FASTING LIPID PROFILE 11/17/2017 11/17/2016, 11/19/2014    SERUM CREATININE 11/17/2017 11/17/2016, 1/4/2016, 9/10/2015, 5/28/2015, 11/19/2014            Current Immunizations     13-VALENT PCV PREVNAR 10/20/2015    Influenza Vaccine Adult HD 9/29/2016    Pneumococcal polysaccharide vaccine (PPSV-23) 11/26/2016    SHINGLES VACCINE 9/29/2016      Below and/or attached are the medications your provider expects you to take. Review all of your home medications and newly ordered medications with your provider and/or pharmacist. Follow medication instructions as  directed by your provider and/or pharmacist. Please keep your medication list with you and share with your provider. Update the information when medications are discontinued, doses are changed, or new medications (including over-the-counter products) are added; and carry medication information at all times in the event of emergency situations     Allergies:  PCN - Swelling     SULFA DRUGS - Hives     PAIN RELIEF - (reactions not documented)               Medications  Valid as of: August 24, 2017 -  2:41 PM    Generic Name Brand Name Tablet Size Instructions for use    Aspirin (Chew Tab) ASA 81 MG Take 81 mg by mouth every day.        Cholecalciferol (Cap) VITAMIN D3 5000 UNIT Take 1 Cap by mouth every day.        Fexofenadine HCl   Take  by mouth.        Gabapentin (Cap) NEURONTIN 300 MG Take 300 mg by mouth 3 times a day.        Ibuprofen (Cap) Ibuprofen 200 MG Take  by mouth.        Levothyroxine Sodium (Tab) SYNTHROID 112 MCG Take 1 Tab by mouth Every morning on an empty stomach.        Multiple Vitamins-Minerals (Tab) OCUVITE  Take 1 Tab by mouth every day.        .                 Medicines prescribed today were sent to:     [facility] [address] [address]    Phone: [phone] Fax: [phone]    Open 24 Hours?: No      Medication refill instructions:       If your prescription bottle indicates you have medication refills left, it is not necessary to call your provider’s office. Please contact your pharmacy and they will refill your medication.    If your prescription bottle indicates you do not have any refills left, you may request refills at any time through one of the following ways: The online [facility] system (except Urgent Care), by calling your provider’s office, or by asking your pharmacy to contact your provider’s office with a refill request. Medication refills are processed only during regular business hours and may not be available  until the next business day. Your provider may request additional information or to have a follow-up visit with you prior to refilling your medication.   *Please Note: Medication refills are assigned a new Rx number when refilled electronically. Your pharmacy may indicate that no refills were authorized even though a new prescription for the same medication is available at the pharmacy. Please request the medicine by name with the pharmacy before contacting your provider for a refill.        Your To Do List     Future Labs/Procedures Complete By Expires    CBC WITH DIFFERENTIAL  As directed 8/24/2018    COMP METABOLIC PANEL  As directed 8/24/2018    HEMOGLOBIN A1C  As directed 8/24/2018    LIPID PROFILE  As directed 8/24/2018    OCCULT BLOOD FECES IMMUNOASSAY  As directed 8/24/2018    TSH WITH REFLEX TO FT4  As directed 8/24/2018    VITAMIN D,25 HYDROXY  As directed 8/24/2018      Instructions    Knee high support stockings for working women.            MyChart Status: Patient Declined

## 2017-08-24 NOTE — PROGRESS NOTES
Chief Complaint   Patient presents with   • Follow-Up     Blood pressure        HISTORY OF PRESENT ILLNESS: Patient is a 82 y.o. female established patient who presents today to discuss the medical issues below.    Neuropathy (CMS-HCC)  Patient dx with idiopathic neuropathy in 2003, subsequently with numbness bilateral feet at baseline per the patient. Using the gabapentin and feels this helps, continues with Dr Williamson.      Edema  Patient has lost about 35# primarily with portion control and is currently not taking any diuretics.  She reports nite slight edema.      Diabetes mellitus type 2 in obese  Stable since the weight loss.  Following low carbohydrate diet and reports home weight at 135#    Hypothyroid  Continues on the thyroid replacement at 112 mcg, no labs since increase.      Osteoarthritis  Hips achy but she doesn't want hip replacement.      Vitamin D deficiency  Patient is taking the Vit D but has not had follow up labs.      Meniere's disease of right ear  Continues with Dr Glover, using the cane regullarly.  Has her ears cleaned annually       Patient Active Problem List    Diagnosis Date Noted   • Diabetes mellitus type 2 in obese (CMS-HCC) 11/18/2014     Priority: High   • Neuropathy (CMS-HCC) 11/18/2014     Priority: High   • Vitamin D deficiency 02/21/2017     Priority: Medium   • Obesity (BMI 30-39.9) 06/04/2015     Priority: Medium   • Osteoarthritis 11/18/2014     Priority: Medium   • Edema 11/18/2014     Priority: Medium   • Hypothyroid 11/18/2014     Priority: Medium   • Depression 11/18/2014     Priority: Medium   • Hyperkeratosis 02/21/2017   • Meniere's disease of right ear 11/16/2016   • Vision loss 02/16/2016   • Allergic rhinitis due to pollen 02/16/2016   • Dental abscess 07/16/2015   • Diarrhea 04/16/2015   • Excessive sleepiness 12/19/2014   • Cellulitis 12/03/2014   • Sciatica 11/18/2014   • Lower urinary tract infectious disease 11/18/2014       Allergies:Pcn; Sulfa drugs; and  Pain relief    Current Outpatient Prescriptions   Medication Sig Dispense Refill   • cholecalciferol (VITAMIN D3) 5000 UNIT Cap Take 1 Cap by mouth every day. 90 Cap 3   • gabapentin (NEURONTIN) 300 MG Cap Take 300 mg by mouth 3 times a day.     • Multiple Vitamins-Minerals (OCUVITE) Tab Take 1 Tab by mouth every day.     • aspirin (BABY ASPIRIN) 81 MG CHEW chewable tablet Take 81 mg by mouth every day.     • levothyroxine (SYNTHROID) 112 MCG Tab Take 1 Tab by mouth Every morning on an empty stomach. 30 Tab 6   • Fexofenadine HCl (ALLEGRA ALLERGY PO) Take  by mouth.     • Ibuprofen (ADVIL) 200 MG CAPS Take  by mouth.       No current facility-administered medications for this visit.         Past Medical History   Diagnosis Date   • Diabetes mellitus (CMS-HCC)    • Diabetes (CMS-HCC) 2014   • Osteoarthritis 2014     recommended hips and knee replacemtns, Dr Sarah Colunga   • Edema 2014   • Sciatica 2014   • UTI (lower urinary tract infection) 2014   • Neuropathy (CMS-HCC) 2014     Dr Williamson idiopathic neuropathy, 2003   • Hypothyroid 2014   • Depression 2014   • Idiopathic neuropathy 2014     Patient adamant this started prior to the dx of DM/    • Vertigo 2014   • Excessive sleepiness 2014   • Diarrhea 2015   • Dental abscess 2015   • Vision loss 2016   • Allergic rhinitis due to pollen 2016   • Vitamin D deficiency 2017   • Hyperkeratosis 2017       Social History   Substance Use Topics   • Smoking status: Former Smoker     Types: Cigarettes     Quit date: 2016   • Smokeless tobacco: Never Used   • Alcohol Use: 0.0 oz/week     0 Standard drinks or equivalent per week      Comment: occasional        Family Status   Relation Status Death Age   • Son  56     MI   • Son  36        • Mother  93     colon rupture   • Father  72     thrombosis   History reviewed. No pertinent  "family history.    ROS:    Respiratory: Negative for cough, sputum production, shortness of breath or wheezing.    Cardiovascular: Negative for chest pain, palpitations, orthopnea, dyspnea with exertion  Gastrointestinal: Negative for GI upset, nausea, vomiting, abdominal pain, constipation or diarrhea.   Genitourinary: Negative for dysuria, urgency, hesitancy or frequency.       Exam:    Blood pressure 138/52, pulse 67, temperature 36.7 °C (98.1 °F), resp. rate 16, height 1.448 m (4' 9.01\"), weight 62.143 kg (137 lb), SpO2 99 %.  General:  Well nourished, well developed female in NAD.  HENT: Normocephalic, bilateral TMs are intact, nasal and oral mucosa with no lesions,   Neck: Supple without bruit. Thyroid is not enlarged.  Pulmonary: Clear to ausculation and percussion.  Normal effort. No rales, rhonchi, or wheezing.  Cardiovascular: Regular rate and rhythm without murmur.   Abdomen: Normal bowel sounds soft and nontender no palpable liver spleen bladder mass.  Extremities: Trace nonpitting edema  Neuro: Grossly nonfocal.  Psych: Alert and oriented to person, place, and time. Appropriate mood and conversation.    No recent labs    This dictation was created using voice recognition software. I have made reasonable attempts to correct errors, however, errors of grammar and content may exist.          Assessment/Plan:    1. Neuropathy (CMS-[facility])  At baseline stable on gabapentin following with neurology    2. Edema, unspecified type  Minimal with no diuretics. Discussed minimal support hose stocking option    3. Diabetes mellitus type 2 in obese (CMS-[facility])  No recent labs for assessment weight is stable on no medications working on diet  - COMP METABOLIC PANEL; Future  - CBC WITH DIFFERENTIAL; Future  - HEMOGLOBIN A1C; Future    4. Hypothyroidism due to acquired atrophy of thyroid  Currently on the higher dose of thyroid at 112 µg a day she did not have lab work done and will schedule for follow-up  - TSH WITH REFLEX " TO FT4; Future    5. Primary osteoarthritis of right shoulder  Feels this is at baseline, minimal anti-inflammatory    6. Vitamin D deficiency  She is taking vitamin D assess labs with next draw  - VITAMIN D,25 HYDROXY; Future    7. Hyperlipidemia, unspecified hyperlipidemia type  Continues on aspirin therapy, no statins check levels  - LIPID PROFILE; Future    8. Meniere's disease of right ear  Doing well she is utilizing cane on a regular basis more suggestive of osteoarthritis and generalized age than a true Ménière's at this point following with neurology    9. Colon cancer screening  Patient would like to do stool testing she's had no black stools no weight change or night sweats fever chills reordered.  - OCCULT BLOOD FECES IMMUNOASSAY; Future    Patient was seen for  25 minutes face to face of which more than 50% of the time was spent in counseling and coordination of care regarding the above problems.

## 2017-09-26 DIAGNOSIS — E03.4 HYPOTHYROIDISM DUE TO ACQUIRED ATROPHY OF THYROID: ICD-10-CM

## 2017-09-27 RX ORDER — LEVOTHYROXINE SODIUM 112 UG/1
112 TABLET ORAL
Qty: 30 TAB | Refills: 5 | Status: SHIPPED | OUTPATIENT
Start: 2017-09-27 | End: 2018-05-01 | Stop reason: SDUPTHER

## 2018-08-15 ENCOUNTER — PATIENT OUTREACH (OUTPATIENT)
Dept: HEALTH INFORMATION MANAGEMENT | Facility: OTHER | Age: 83
End: 2018-08-15

## 2018-08-15 NOTE — PROGRESS NOTES
Outcome: no answer - call cannot be completed at this time.     Please transfer to Patient Outreach Team at [phone] when patient returns call.    Web[employer] Checked & Epic Updated:  yes    [facility] Verified: no    Attempt # 1

## 2018-09-05 NOTE — PROGRESS NOTES
Outcome: No Answer     Please transfer to Patient Outreach Team at [phone] when patient returns call.    Attempt # 2

## 2018-09-14 NOTE — PROGRESS NOTES
Outcome: no answer    Please transfer to Patient Outreach Team at [phone] when patient returns call.        Attempt # 3

## 2018-09-21 NOTE — PROGRESS NOTES
Outcome: no answer / no voicemail     Please transfer to Patient Outreach Team at [phone] when patient returns call.      Attempt # 4

## 2018-09-26 NOTE — PROGRESS NOTES
Outcome: no answer     Please transfer to Patient Outreach Team at [phone] when patient returns call.      Attempt # 5th and final